# Patient Record
Sex: FEMALE | Race: BLACK OR AFRICAN AMERICAN | NOT HISPANIC OR LATINO
[De-identification: names, ages, dates, MRNs, and addresses within clinical notes are randomized per-mention and may not be internally consistent; named-entity substitution may affect disease eponyms.]

---
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
## 2023-03-06 ENCOUNTER — EMERGENCY (EMERGENCY)
Facility: HOSPITAL | Age: 18
LOS: 1 days | Discharge: HOME | End: 2023-03-06
Attending: EMERGENCY MEDICINE | Admitting: EMERGENCY MEDICINE
Payer: MEDICAID

## 2023-03-06 ENCOUNTER — EMERGENCY (EMERGENCY)
Facility: HOSPITAL | Age: 18
LOS: 1 days | Discharge: ANOTHER HOSPITAL | End: 2023-03-06
Attending: EMERGENCY MEDICINE | Admitting: EMERGENCY MEDICINE
Payer: MEDICAID

## 2023-03-06 VITALS
OXYGEN SATURATION: 98 % | TEMPERATURE: 100 F | DIASTOLIC BLOOD PRESSURE: 82 MMHG | SYSTOLIC BLOOD PRESSURE: 125 MMHG | HEIGHT: 71 IN | RESPIRATION RATE: 18 BRPM | HEART RATE: 96 BPM

## 2023-03-06 VITALS
SYSTOLIC BLOOD PRESSURE: 116 MMHG | RESPIRATION RATE: 18 BRPM | TEMPERATURE: 101 F | DIASTOLIC BLOOD PRESSURE: 82 MMHG | OXYGEN SATURATION: 99 % | HEART RATE: 108 BPM

## 2023-03-06 VITALS
OXYGEN SATURATION: 100 % | WEIGHT: 225.09 LBS | RESPIRATION RATE: 16 BRPM | HEART RATE: 98 BPM | SYSTOLIC BLOOD PRESSURE: 136 MMHG | TEMPERATURE: 99 F | DIASTOLIC BLOOD PRESSURE: 94 MMHG | HEIGHT: 71 IN

## 2023-03-06 LAB
ALBUMIN SERPL ELPH-MCNC: 3.3 G/DL — SIGNIFICANT CHANGE UP (ref 3.3–5)
ALP SERPL-CCNC: 84 U/L — SIGNIFICANT CHANGE UP (ref 30–120)
ALT FLD-CCNC: 54 U/L DA — SIGNIFICANT CHANGE UP (ref 10–60)
ANION GAP SERPL CALC-SCNC: 9 MMOL/L — SIGNIFICANT CHANGE UP (ref 5–17)
AST SERPL-CCNC: 75 U/L — HIGH (ref 10–40)
BASOPHILS # BLD AUTO: 0.03 K/UL — SIGNIFICANT CHANGE UP (ref 0–0.2)
BASOPHILS NFR BLD AUTO: 0.3 % — SIGNIFICANT CHANGE UP (ref 0–2)
BILIRUB SERPL-MCNC: 0.3 MG/DL — SIGNIFICANT CHANGE UP (ref 0.2–1.2)
BUN SERPL-MCNC: 11 MG/DL — SIGNIFICANT CHANGE UP (ref 7–23)
CALCIUM SERPL-MCNC: 8.7 MG/DL — SIGNIFICANT CHANGE UP (ref 8.4–10.5)
CHLORIDE SERPL-SCNC: 105 MMOL/L — SIGNIFICANT CHANGE UP (ref 96–108)
CO2 SERPL-SCNC: 26 MMOL/L — SIGNIFICANT CHANGE UP (ref 22–31)
CREAT SERPL-MCNC: 1.01 MG/DL — SIGNIFICANT CHANGE UP (ref 0.5–1.3)
EGFR: 82 ML/MIN/1.73M2 — SIGNIFICANT CHANGE UP
EOSINOPHIL # BLD AUTO: 0.07 K/UL — SIGNIFICANT CHANGE UP (ref 0–0.5)
EOSINOPHIL NFR BLD AUTO: 0.7 % — SIGNIFICANT CHANGE UP (ref 0–6)
FLUAV AG NPH QL: SIGNIFICANT CHANGE UP
FLUBV AG NPH QL: SIGNIFICANT CHANGE UP
GLUCOSE SERPL-MCNC: 99 MG/DL — SIGNIFICANT CHANGE UP (ref 70–99)
HCG UR QL: NEGATIVE — SIGNIFICANT CHANGE UP
HCT VFR BLD CALC: 34.3 % — LOW (ref 34.5–45)
HGB BLD-MCNC: 11 G/DL — LOW (ref 11.5–15.5)
IMM GRANULOCYTES NFR BLD AUTO: 0.2 % — SIGNIFICANT CHANGE UP (ref 0–0.9)
LYMPHOCYTES # BLD AUTO: 2.3 K/UL — SIGNIFICANT CHANGE UP (ref 1–3.3)
LYMPHOCYTES # BLD AUTO: 21.9 % — SIGNIFICANT CHANGE UP (ref 13–44)
MCHC RBC-ENTMCNC: 27.4 PG — SIGNIFICANT CHANGE UP (ref 27–34)
MCHC RBC-ENTMCNC: 32.1 GM/DL — SIGNIFICANT CHANGE UP (ref 32–36)
MCV RBC AUTO: 85.5 FL — SIGNIFICANT CHANGE UP (ref 80–100)
MONOCYTES # BLD AUTO: 0.79 K/UL — SIGNIFICANT CHANGE UP (ref 0–0.9)
MONOCYTES NFR BLD AUTO: 7.5 % — SIGNIFICANT CHANGE UP (ref 2–14)
NEUTROPHILS # BLD AUTO: 7.29 K/UL — SIGNIFICANT CHANGE UP (ref 1.8–7.4)
NEUTROPHILS NFR BLD AUTO: 69.4 % — SIGNIFICANT CHANGE UP (ref 43–77)
NRBC # BLD: 0 /100 WBCS — SIGNIFICANT CHANGE UP (ref 0–0)
PLATELET # BLD AUTO: 289 K/UL — SIGNIFICANT CHANGE UP (ref 150–400)
POTASSIUM SERPL-MCNC: 3.8 MMOL/L — SIGNIFICANT CHANGE UP (ref 3.5–5.3)
POTASSIUM SERPL-SCNC: 3.8 MMOL/L — SIGNIFICANT CHANGE UP (ref 3.5–5.3)
PROT SERPL-MCNC: 7.3 G/DL — SIGNIFICANT CHANGE UP (ref 6–8.3)
RBC # BLD: 4.01 M/UL — SIGNIFICANT CHANGE UP (ref 3.8–5.2)
RBC # FLD: 13.1 % — SIGNIFICANT CHANGE UP (ref 10.3–14.5)
RSV RNA NPH QL NAA+NON-PROBE: SIGNIFICANT CHANGE UP
S PYO DNA THROAT QL NAA+PROBE: SIGNIFICANT CHANGE UP
SARS-COV-2 RNA SPEC QL NAA+PROBE: SIGNIFICANT CHANGE UP
SODIUM SERPL-SCNC: 140 MMOL/L — SIGNIFICANT CHANGE UP (ref 135–145)
WBC # BLD: 10.5 K/UL — SIGNIFICANT CHANGE UP (ref 3.8–10.5)
WBC # FLD AUTO: 10.5 K/UL — SIGNIFICANT CHANGE UP (ref 3.8–10.5)

## 2023-03-06 PROCEDURE — 80053 COMPREHEN METABOLIC PANEL: CPT

## 2023-03-06 PROCEDURE — L9997: CPT

## 2023-03-06 PROCEDURE — 36415 COLL VENOUS BLD VENIPUNCTURE: CPT

## 2023-03-06 PROCEDURE — 96375 TX/PRO/DX INJ NEW DRUG ADDON: CPT | Mod: XU

## 2023-03-06 PROCEDURE — 96366 THER/PROPH/DIAG IV INF ADDON: CPT

## 2023-03-06 PROCEDURE — 87798 DETECT AGENT NOS DNA AMP: CPT

## 2023-03-06 PROCEDURE — 70491 CT SOFT TISSUE NECK W/DYE: CPT | Mod: MA

## 2023-03-06 PROCEDURE — 96365 THER/PROPH/DIAG IV INF INIT: CPT | Mod: XU

## 2023-03-06 PROCEDURE — 99285 EMERGENCY DEPT VISIT HI MDM: CPT

## 2023-03-06 PROCEDURE — 99285 EMERGENCY DEPT VISIT HI MDM: CPT | Mod: 25

## 2023-03-06 PROCEDURE — 96361 HYDRATE IV INFUSION ADD-ON: CPT

## 2023-03-06 PROCEDURE — 85025 COMPLETE CBC W/AUTO DIFF WBC: CPT

## 2023-03-06 PROCEDURE — 87651 STREP A DNA AMP PROBE: CPT

## 2023-03-06 PROCEDURE — 87637 SARSCOV2&INF A&B&RSV AMP PRB: CPT

## 2023-03-06 PROCEDURE — 81025 URINE PREGNANCY TEST: CPT

## 2023-03-06 PROCEDURE — 70491 CT SOFT TISSUE NECK W/DYE: CPT | Mod: 26,MA

## 2023-03-06 RX ORDER — KETOROLAC TROMETHAMINE 30 MG/ML
15 SYRINGE (ML) INJECTION ONCE
Refills: 0 | Status: DISCONTINUED | OUTPATIENT
Start: 2023-03-06 | End: 2023-03-06

## 2023-03-06 RX ORDER — ACETAMINOPHEN 500 MG
1000 TABLET ORAL ONCE
Refills: 0 | Status: COMPLETED | OUTPATIENT
Start: 2023-03-06 | End: 2023-03-06

## 2023-03-06 RX ORDER — SODIUM CHLORIDE 9 MG/ML
1000 INJECTION INTRAMUSCULAR; INTRAVENOUS; SUBCUTANEOUS ONCE
Refills: 0 | Status: COMPLETED | OUTPATIENT
Start: 2023-03-06 | End: 2023-03-06

## 2023-03-06 RX ORDER — AMPICILLIN SODIUM AND SULBACTAM SODIUM 250; 125 MG/ML; MG/ML
3 INJECTION, POWDER, FOR SUSPENSION INTRAMUSCULAR; INTRAVENOUS EVERY 6 HOURS
Refills: 0 | Status: DISCONTINUED | OUTPATIENT
Start: 2023-03-06 | End: 2023-03-10

## 2023-03-06 RX ORDER — IBUPROFEN 200 MG
600 TABLET ORAL ONCE
Refills: 0 | Status: COMPLETED | OUTPATIENT
Start: 2023-03-06 | End: 2023-03-06

## 2023-03-06 RX ORDER — DEXAMETHASONE 0.5 MG/5ML
8 ELIXIR ORAL ONCE
Refills: 0 | Status: COMPLETED | OUTPATIENT
Start: 2023-03-06 | End: 2023-03-06

## 2023-03-06 RX ORDER — SODIUM CHLORIDE 9 MG/ML
1000 INJECTION, SOLUTION INTRAVENOUS ONCE
Refills: 0 | Status: COMPLETED | OUTPATIENT
Start: 2023-03-06 | End: 2023-03-06

## 2023-03-06 RX ADMIN — Medication 101.6 MILLIGRAM(S): at 17:34

## 2023-03-06 RX ADMIN — Medication 400 MILLIGRAM(S): at 19:33

## 2023-03-06 RX ADMIN — Medication 8 MILLIGRAM(S): at 19:00

## 2023-03-06 RX ADMIN — SODIUM CHLORIDE 1000 MILLILITER(S): 9 INJECTION INTRAMUSCULAR; INTRAVENOUS; SUBCUTANEOUS at 15:15

## 2023-03-06 RX ADMIN — Medication 600 MILLIGRAM(S): at 22:00

## 2023-03-06 RX ADMIN — AMPICILLIN SODIUM AND SULBACTAM SODIUM 200 GRAM(S): 250; 125 INJECTION, POWDER, FOR SUSPENSION INTRAMUSCULAR; INTRAVENOUS at 17:34

## 2023-03-06 RX ADMIN — AMPICILLIN SODIUM AND SULBACTAM SODIUM 3 GRAM(S): 250; 125 INJECTION, POWDER, FOR SUSPENSION INTRAMUSCULAR; INTRAVENOUS at 19:00

## 2023-03-06 RX ADMIN — SODIUM CHLORIDE 1000 MILLILITER(S): 9 INJECTION, SOLUTION INTRAVENOUS at 22:00

## 2023-03-06 NOTE — ED PROVIDER NOTE - OBJECTIVE STATEMENT
Patient complaining of sore throat for 1 week worsening.  Patient relates she had seen the doctor at her school 3 days ago had a negative strep test, was given amoxicillin anyway which she has been taking without improvement.  Patient relates pain is also that she is unable to tolerate any p.o. intake.  Patient reports occasional nonproductive cough.  No fevers chills headache chest pain short of breath abdominal pain nausea vomiting  Pediatrician sawyer

## 2023-03-06 NOTE — ED PROVIDER NOTE - DIFFERENTIAL DIAGNOSIS
Differential includes but not limited to strep flu COVID RSV electrolyte abnormality Ambrocio's angina retropharyngeal abscess epiglottitis Differential Diagnosis

## 2023-03-06 NOTE — ED ADULT TRIAGE NOTE - CHIEF COMPLAINT QUOTE
transfer from [place] for ent    pt is transfer from [place] for ent consult. difficult to speak but no diff breathing.  no drooling, stridor.  denies pmhx

## 2023-03-06 NOTE — ED ADULT NURSE REASSESSMENT NOTE - NS ED NURSE REASSESS COMMENT FT1
Pt received at 1900. Pt AAOx4, speaking in full sentences with no difficulty, no drooling noted, respirations even and unlabored, lungs CTA BL. Pt reporting mild difficulty breathing through mouth, spo2 99% on RA. Pt placed on cont spo2 monitoring. Denies CP, N/V, abdominal pain, dizziness/lightheadedness. Pt febrile and ED MD aware, pt to get IV tylenol as ordered. Pt aware of awaiting transfer to [facility] at this time. Bed locked in lowest position with appropriate side rails raised. Pt given call bell and explained call bell system. Pt has no other questions or concerns at this time.
Pt for transfer to [facility] - as arranged by Dr Metz for ENT consultation . Report given to Nurse Gale of [facility] ER

## 2023-03-06 NOTE — ED PROVIDER NOTE - PHYSICAL EXAMINATION
VS:  unremarkable except LG temp    GEN - NAD;   malaise;   A+O x3   HEAD - NC/AT     ENT - PEERL, EOMI, mucous membranes    moist , no discharge    Voice normal, airway patent, managing secretions, no trismus.  Able to drink water.  Tonsils swollen but not kissing.  Mild neck ttp and no deformity, no floor of mouth swelling.  ROM of neck is good.    NECK: Neck supple, non-tender without lymphadenopathy, no masses, no JVD  PULM - CTA b/l,  symmetric breath sounds  COR -  normal heart sounds    ABD - , ND, NT, soft,  BACK - no CVA tenderness, nontender spine     EXTREMS - no edema, no deformity, warm and well perfused    SKIN - no rash    or bruising      NEUROLOGIC - alert, face symmetric, speech fluent, sensation nl, motor no focal deficit.

## 2023-03-06 NOTE — ED PROVIDER NOTE - CONSTITUTIONAL, MLM
normal... Well appearing, awake, alert, oriented to person, place, time/situation and in no apparent distress. no drooling

## 2023-03-06 NOTE — ED ADULT NURSE NOTE - OBJECTIVE STATEMENT
Pt came in for sore throat Pt came in for sore throat and difficulty swallowing. Pt reported have sore throat for 5 day now. Pt went to see her school clinic and prescribed with oral Amoxicillin . Pt also stated that her throat swab was negative for step. Pt came in for worsening throat pain and redness . No exudate noted. Pt denies any vomiting, nausea, abdominal pain , diarrhea , fever or chills.

## 2023-03-06 NOTE — ED PROVIDER NOTE - CLINICAL SUMMARY MEDICAL DECISION MAKING FREE TEXT BOX
17F p/w xferred for ENT eval.  Pt reports has been having sore throat x 1 week, went to [facility] due to worsening pain and difficulty swallowing, unable to swallow food, difficulty swallowing liquids.  At [facility] she got unasyn and decadron, ofirmev and fluids, CT was done showing tonsillitis.  Since then she's been feeling better.  D/w ENT Dr Navarrete - the reason they transferred pt was because they did not have anyone to intubate her if she needed it given her airway was 6-7mm.  There was no abscess on CT there was only tonsillitis.  No indication for ENT intervention per me and per ENT.  No indication for intubation and no concern for airway compromise.  Able to drink water and manage pills.  No fever.  Able to manage her secretions, no trismus, no drooling.  PMHX denies  PSHX denies.  No meds.  No T NKDA.  Pt able to drink water.  Rx ibuprofen, fluids, reass.  Will rx augmentin x 10 days, steroids per ENT reccs, d/c home f/u ENT as outpt.

## 2023-03-06 NOTE — ED ADULT TRIAGE NOTE - NS ED TRIAGE AVPU SCALE
Your Child's Health  Girls over 12      Aury Jon  June 11, 2021    There were no vitals taken for this visit.  Weight:       Adolescence begins about age 10 in girls and ends somewhere in the early twenties.  During this time many changes occur.    BODY CHANGES:  There is no other time of life, except in the first year, when your body grows so rapidly.  This is called a \"growth spurt\" and usually precedes and accompanies changes in your body shape as well as changes in your sexual organs.  Bones and muscles grow rapidly, fat is added and distributed in an adult manner, and the result is a rapid increase in height and weight.  It may even take a while for your coordination and skills to catch up with your rapidly changing body.  Your hands and feet are the first to grow, followed by lengthening of your arms and legs.  Finally, your spine grows, and your rib cage and your pelvis widen.    Various hormones secreted by the endocrine glands lead to development.  The first major change is breast development.  The second is usually pubic hair (hair between the legs), followed by body odor, hair in the armpits, and finally menstruation (periodic bleeding or periods).  In some families, the hair and odor precede the development of the breast tissue.  The whole process takes about two years, although in some cases it may take as long as four or five years.    SEXUAL DEVELOPMENT AND MENSTRUATION:  Breast development may normally begin at any time after the age of eight.  One side may begin to grow several months before the other, and unevenness is common, especially in the first year.  About two-thirds of adult women are somewhat unequal from one side to the other; small differences are insignificant, but large differences may cause considerable worry later.  Bloody or milky discharge from the breast is never normal.  Please let us know if this occurs.    Girls whose breasts begin to develop early will  usually grow early, as compared to their friends. Girls who begin to develop later than average (14 years and up) will continue to grow after their friends have stopped.    The second sign of external development is growth of pubic hair.  The third is growth of underarm hair, and the last is menstruation.  Menstruation occurs about two years after breast development starts.  It may begin earlier or, in rare cases, as much as five years later.  Let us know if you have monthly lower abdominal discomfort or pain without any blood.  Many girls will have a clear mucous vaginal discharge prior to their periods.  This is normal, as long as it doesn't itch, burn, or have a strong odor.    Often, menstrual periods occur irregularly at first.  With time, they usually become more regular.  \"Cramps\" can occur in the lower abdomen or in the back.  They are usually mild, and sometimes increase in severity with age. Cramps are the result of an excess of the muscle contraction chemical in the uterus (womb).  They are not a sign of weakness or low pain tolerance.  Acetaminophen or ibuprofen are often helpful.  The newer ibuprofen medicines help 85 percent of girls.  Sometimes a prescribed medication may be needed if these over-the-counter medications are not adequate.     Some women experience Premenstrual Syndrome (PMS), which is characterized by bloating, swollen or tender breasts, headaches, depression, or irritability.  These symptoms occur prior to and at the beginning of a menstrual period.  This is unusual in teens and should be discussed with your doctor if it occurs.    Tampons are generally safe, comfortable, and easy to use, but adolescents who use tampons should be aware of the small risk of Toxic Shock Syndrome.  This results from a bacterial infection that may occur in tampon users who leave tampons in for more than four hours.  Symptoms include high fever, headache, vomiting, diarrhea, rash, and shock.  Should these  symptoms occur while you are using a tampon, you should remove it and notify your doctor immediately.  If you would like to use tampons but feel uncomfortable asking your mother how they are properly used, a female healthcare worker can demonstrate this for you.    There is a lot of normal variability in period timing.  However, you should make an appointment if the time between the start of one period and the start of the next is less than three weeks, or if the time between periods is more than 3 months.  Other signs that an exam may be needed are very short periods of two days or less, uncontrollable cramps, or any risk of pregnancy or venereal disease.    Once you start having periods, it is physically possible to get pregnant and have a baby.  Becoming pregnant is not a decision to be taken lightly.  There is no sure way to avoid pregnancy or venereal disease except to avoid sexual contact.  If 100 girls take the best known birth control and follow the directions perfectly, one will get pregnant each year.     Girls get venereal diseases from boys more easily than boys get them from girls, so girls have to be more careful.  No matter what anybody says, AIDS is fatal and a cure in the near future is not likely.  Please don't take chances.    SKIN PROBLEMS:  Acne affects 90 percent of teens.  It often starts earlier in girls than in boys, and it is generally at its worst around 15-16 years of age.  There are a lot of exceptions.  Call us if the over-the-counter medications are not helping.  Remember that washing too frequently, scrubbing, or drying vigorously may convert blackheads into white or red pimples.  Benzoyl peroxide is the most effective ingredient of those available over the counter.    Most of us are born without brown spots on our skin (also called moles or wens).  By the time we are 20 years old, the average person has about 40 such spots.  They are normal as long as they remain brown, have a sharp  edge, and remain smaller than the diameter of a pencil eraser.  If you have moles with any other characteristics, please show them to your doctor.  We would especially like to see moles that have been present since birth.  Please also let us know if there is skin cancer in your family.  Skin cancer almost never occurs under ten years of age, but there are a few cases in the teens.  The most dangerous skin cancers occur in people who have had sunburn, especially repeatedly, which is why we recommend sunscreens and protective clothing.     DIET:  It is important to maintain a balanced diet.  Calcium, most readily available in dairy products, is particularly important for strong bones.  If you cannot drink 3 glasses of milk a day without stomach aches, please tell us.  Limit your intake of fatty snacks (chips, fries, etc.).  Avoid eating in front of the TV or while preoccupied with music or videos.  You often eat more than you really want when distracted.  It is always important to eat as wide a variety of fruits and vegetables as you can.  If you drink less than 32 oz of milk per day, you should take a multivitamin with 600 International Units vitamin D.    Adolescent girls also need iron in their diets.  Menstruation depletes your iron supply.  Iron, found in red meats and dark green vegetables, is necessary as part of your daily diet to prevent anemia.    SAFETY:  Car accidents and gunshot wounds are major killers in your age range.  Your reflexes are quick, but seatbelts are still necessary when you ride in a car.  \"Friends\" who drive recklessly, drink and drive, use drugs, or play with guns or knives are being unreasonable and dangerous.  No one wants to be called a \"chicken\", but standing up to being called a chicken in front of others is braver and safer.    Backpacks may cause neck strain or weakening of the arms if they exceed 15 percent  of your weight or are carried for long periods over just one  shoulder.    SUBSTANCE ABUSE:  [place] unfortunately leads the nation in drinking and binge drinking.  Remember that every beer is 120-150 calories.  If you want information about alcohol, tobacco, or drugs for yourself or a friend, please ask us.  We will be glad to talk with you or mail information to you.  It is very hard to stop smoking once you start.  Please don't start.    RESPECT AND DATING:  When boys and girls get together, both the girls and the boys are trying to make a good impression.  Some people can make a good impression just by being themselves, while others may try a little too hard to please.  When you begin dating, you will probably want to date with a group of friends or date where an adult is within calling distance.  You are entitled to choose whom you would like to date and where.  At your age, most boys are becoming physically stronger.  Some have come to believe from stories they have heard that girls like to be treated roughly.  NO ONE HAS THE RIGHT TO HURT YOU.    Watch how your date treats his friends and family.  If he is mean to everyone else, be careful.  Despite what you see on TV or hear from your friends, most girls do not spend a night alone with a boy until after high school.  You are a good person who should have the confidence to expect friends (male or female) to treat you with respect.  Please tell your parents or tell us if someone has hurt you.    MEDICATIONS:  Once you are 12 or older, you can generally take adult doses of over-the-counter medications. However, you should not take extra-strength doses of acetaminophen (for example, 500 mg Tylenol tablets) unless you weigh more than 140 pounds.    MEDICATION FOR FEVER OR PAIN:   Acetaminophen liquid (e.g., Tylenol or Tempra) may be given every four hours as needed for pain or fever.  Acetaminophen liquid is less concentrated than the infant dropper bottle type.  Be sure to check which product CONCENTRATION you are  using.      CHILDREN’S Tylenol/Acetaminophen  (160 MG/5 mL)    Child’s Weight:  Dose:  36 - 47 pounds:    240 mg (7.5 mL (1 1/2 Teaspoons))  48 - 59 pounds:    320 mg (10.0 mL (2 Teaspoons))  60 - 71 pounds:    400 mg (12.5 mL (2 1/2 Teaspoons))  72 - 95 pounds:    480 mg (15.0 mL (3 Teaspoons))  Greater than 96 pounds:   640 mg (20.0 mL (4 Teaspoons))    CHILDREN’S Tylenol/Acetaminophen MELTAWAYS ( 80 MG tablets)    Child’s Weight:  Dose:  36 - 47 pounds:    240 mg (3 meltaway tablets)  48 - 59 pounds:    320 mg (4 meltaway tablets)  60 - 71 pounds:    400 mg (5 meltaway tablets)  72 - 95 pounds:    480 mg (6 meltaway tablets)  Greater than 96 pounds:   640 mg (8 meltaway tablets)    David () Tylenol/Acetaminophen MELTAWAYS (160 MG tablets)    Child’s Weight:  Dose:  36 - 47 pounds:    240 mg (1 1/2 meltaway tablets)  48 - 59 pounds:    320 mg (2 meltaway tablets)  60 - 71 pounds:    400 mg (2 1/2 meltaway tablets)  72 - 95 pounds:    480 mg (3 meltaway tablets)  Greater than 96 pounds:   640 mg (4 meltaway tablets)    CHILDREN'S Ibuprofen (e.g., Advil or Motrin) may be given every six hours as needed for pain or fever.    48 - 59 pounds:                       200 mg (2 Teaspoons)  60 - 71 pounds:                       250 mg (2 1/2 Teaspoons)  72 - 95 pounds:                       300 mg (3 Teaspoons)    NEXT VISIT: 1 year      Thank you for entrusting your care to [facility].   Alert-The patient is alert, awake and responds to voice. The patient is oriented to time, place, and person. The triage nurse is able to obtain subjective information.

## 2023-03-06 NOTE — ED PROVIDER NOTE - NSFOLLOWUPINSTRUCTIONS_ED_ALL_ED_FT
FOLLOW UP WITH ENT WITHIN 1 WEEK  BRING THE COPIES OF YOUR RESULTS WITH YOU (PROVIDED)  CAN TAKE TYLENOL 650MG ORALLY EVERY 6 HOURS FOR PAIN OR FEVER.  IBUPROFEN 400MG ORALLY EVERY 6 HOURS FOR PAIN OR FEVER.    CAN TAKE TYLENOL AND IBUPROFEN AT THE SAME TIME  RETURN TO ED FOR NEW OR WORSENING SYMPTOMS.    AUGMENTIN 875/125 MG ORALLY TWICE DAILY FOR 10 DAYS  PREDNISONE 40MG ORALLY DAILY FOR 4 MORE DAYS START TOMORROW.

## 2023-03-06 NOTE — ED PROVIDER NOTE - PROGRESS NOTE DETAILS
d/w waldo ([facility]) requests xfer er->er to [facility] d/w dacia ([facility]) will accept xfer er->er

## 2023-03-06 NOTE — ED PROVIDER NOTE - OBJECTIVE STATEMENT
17F p/w xferred for ENT eval.  Pt reports has been having sore throat x 1 week, went to [facility] due to worsening pain and difficulty swallowing, unable to swallow food, difficulty swallowing liquids.  At [facility] she got unasyn and decadron, ofirmev and fluids, CT was done showing tonsillitis.  Since then she's been feeling better.  D/w ENT Dr Navarrete - the reason they transferred pt was because they did not have anyone to intubate her if she needed it given her airway was 6-7mm.  There was no abscess on CT there was only tonsillitis.  No indication for ENT intervention per me and per .  No fever.  Able to manage her secretions, no trismus, no drooling.  PMHX denies  PSHX denies.  No meds.  No T NKDA.  Pt able to drink water.  Rx ibuprofen, fluids, reass.  Will rx augmentin x 10 days, steroids per ENT reccs, d/c home f/u ENT as outpt.    VS:  unremarkable except LG temp    GEN - NAD;   malaise;   A+O x3   HEAD - NC/AT     ENT - PEERL, EOMI, mucous membranes    moist , no discharge    Voice normal, airway patent, managing secretions, no trismus.  Able to drink water.  Tonsils swollen but not kissing.  Mild neck ttp and no deformity, no floor of mouth swelling.  ROM of neck is good.    NECK: Neck supple, non-tender without lymphadenopathy, no masses, no JVD  PULM - CTA b/l,  symmetric breath sounds  COR -  normal heart sounds    ABD - , ND, NT, soft,  BACK - no CVA tenderness, nontender spine     EXTREMS - no edema, no deformity, warm and well perfused    SKIN - no rash    or bruising      NEUROLOGIC - alert, face symmetric, speech fluent, sensation nl, motor no focal deficit. 17F p/w xferred for ENT eval.  Pt reports has been having sore throat x 1 week, went to [facility] due to worsening pain and difficulty swallowing, unable to swallow food, difficulty swallowing liquids.  At [facility] she got unasyn and decadron, ofirmev and fluids, CT was done showing tonsillitis.  Since then she's been feeling better.  D/w ENT Dr Navarrete - the reason they transferred pt was because they did not have anyone to intubate her if she needed it given her airway was 6-7mm.  There was no abscess on CT there was only tonsillitis.  No indication for ENT intervention per me and per ENT.  No indication for intubation and no concern for airway compromise.  Able to drink water and manage pills.  No fever.  Able to manage her secretions, no trismus, no drooling.  PMHX denies  PSHX denies.  No meds.  No T NKDA.  Pt able to drink water.  Rx ibuprofen, fluids, reass.  Will rx augmentin x 10 days, steroids per ENT reccs, d/c home f/u ENT as outpt.    VS:  unremarkable except LG temp    GEN - NAD;   malaise;   A+O x3   HEAD - NC/AT     ENT - PEERL, EOMI, mucous membranes    moist , no discharge    Voice normal, airway patent, managing secretions, no trismus.  Able to drink water.  Tonsils swollen but not kissing.  Mild neck ttp and no deformity, no floor of mouth swelling.  ROM of neck is good.    NECK: Neck supple, non-tender without lymphadenopathy, no masses, no JVD  PULM - CTA b/l,  symmetric breath sounds  COR -  normal heart sounds    ABD - , ND, NT, soft,  BACK - no CVA tenderness, nontender spine     EXTREMS - no edema, no deformity, warm and well perfused    SKIN - no rash    or bruising      NEUROLOGIC - alert, face symmetric, speech fluent, sensation nl, motor no focal deficit.

## 2023-04-19 NOTE — ED ADULT NURSE NOTE - CHIEF COMPLAINT QUOTE
alert and awake/follows commands sore throat treated with amoxicillin fr 4 days  states cant swallow

## 2023-10-02 NOTE — ED ADULT TRIAGE NOTE - WEIGHT IN LBS
"[place]" Orville was seen and treated in our emergency department on 10/2/2023.  He may return to school on 10/03/2023.      If you have any questions or concerns, please don't hesitate to call.      Wayne Womack MD" [phone]

## 2024-03-20 ENCOUNTER — EMERGENCY (EMERGENCY)
Facility: HOSPITAL | Age: 19
LOS: 1 days | Discharge: HOME | End: 2024-03-20
Attending: EMERGENCY MEDICINE | Admitting: EMERGENCY MEDICINE
Payer: MEDICAID

## 2024-03-20 VITALS
HEART RATE: 79 BPM | SYSTOLIC BLOOD PRESSURE: 109 MMHG | DIASTOLIC BLOOD PRESSURE: 72 MMHG | RESPIRATION RATE: 18 BRPM | TEMPERATURE: 99 F | WEIGHT: 220.46 LBS | OXYGEN SATURATION: 100 % | HEIGHT: 70 IN

## 2024-03-20 LAB — HCG UR QL: NEGATIVE — SIGNIFICANT CHANGE UP

## 2024-03-20 PROCEDURE — 99285 EMERGENCY DEPT VISIT HI MDM: CPT | Mod: 25

## 2024-03-20 PROCEDURE — 93010 ELECTROCARDIOGRAM REPORT: CPT

## 2024-03-20 RX ORDER — AMOXICILLIN 250 MG/5ML
0 SUSPENSION, RECONSTITUTED, ORAL (ML) ORAL
Qty: 0 | Refills: 0 | DISCHARGE

## 2024-03-20 NOTE — ED PROVIDER NOTE - OBJECTIVE STATEMENT
18y F BIBEMS for syncopal episode - pt states for the past week her sleep pattern has been off, staying up late at night, taking naps during the day, states got up this morning, had some chipotle, took a nap from 11 to 5, got up, ate cereal, relaxed, this evening went to take hot shower, during shower started to feel like breathing was off, lightheaded, ears underwater, turned hot water down, symptoms continued, got out of the shower, sat in a stall, called her mom, appears to have had +LOC, no fall/trauma, woke and her mom was screaming through the phone, she was able to get up, felt tingling/cramping left hand, walked back to her room, felt tingling/cramping right leg, called 911, right now still feels a little lightheaded
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
## 2024-03-20 NOTE — ED ADULT NURSE NOTE - OBJECTIVE STATEMENT
pt to ED; BIBA; reports she "passed out". states she last ate about 12 hours ago and her "sleep schedule is off". c/o dizziness and nausea at this time. denies head injury

## 2024-03-20 NOTE — ED PROVIDER NOTE - PATIENT PORTAL LINK FT
You can access the FollowMyHealth Patient Portal offered by [facility] by registering at the following website: [email or web address]. By joining [employer]’s FollowMyHealth portal, you will also be able to view your health information using other applications (apps) compatible with our system.

## 2024-03-20 NOTE — ED PROVIDER NOTE - CLINICAL SUMMARY MEDICAL DECISION MAKING FREE TEXT BOX
syncopal episode, pt reports not sleeping/eating well recently, will check ekg, monitor tele, check basic and cardiac labs, r/o PE with dimer, check cxr

## 2024-03-20 NOTE — ED ADULT NURSE NOTE - NSFALLHARMRISKINTERV_ED_ALL_ED
Assistance OOB with selected safe patient handling equipment if applicable/Assistance with ambulation/Communicate risk of Fall with Harm to all staff, patient, and family/Encourage patient to sit up slowly, dangle for a short time, stand at bedside before walking/Monitor gait and stability/Orthostatic vital signs/Provide visual cue: red socks, yellow wristband, yellow gown, etc/Reinforce activity limits and safety measures with patient and family/Bed in lowest position, wheels locked, appropriate side rails in place/Call bell, personal items and telephone in reach/Instruct patient to call for assistance before getting out of bed/chair/stretcher/Non-slip footwear applied when patient is off stretcher/[place] to call system/Physically safe environment - no spills, clutter or unnecessary equipment/Purposeful Proactive Rounding/Room/bathroom lighting operational, light cord in reach

## 2024-03-21 VITALS
SYSTOLIC BLOOD PRESSURE: 101 MMHG | TEMPERATURE: 98 F | OXYGEN SATURATION: 100 % | HEART RATE: 64 BPM | RESPIRATION RATE: 17 BRPM | DIASTOLIC BLOOD PRESSURE: 68 MMHG

## 2024-03-21 LAB
ALBUMIN SERPL ELPH-MCNC: 3.6 G/DL — SIGNIFICANT CHANGE UP (ref 3.3–5)
ALP SERPL-CCNC: 74 U/L — SIGNIFICANT CHANGE UP (ref 30–120)
ALT FLD-CCNC: 19 U/L — SIGNIFICANT CHANGE UP (ref 10–60)
ANION GAP SERPL CALC-SCNC: 8 MMOL/L — SIGNIFICANT CHANGE UP (ref 5–17)
AST SERPL-CCNC: 15 U/L — SIGNIFICANT CHANGE UP (ref 10–40)
BASOPHILS # BLD AUTO: 0.06 K/UL — SIGNIFICANT CHANGE UP (ref 0–0.2)
BASOPHILS NFR BLD AUTO: 0.6 % — SIGNIFICANT CHANGE UP (ref 0–2)
BILIRUB SERPL-MCNC: 0.3 MG/DL — SIGNIFICANT CHANGE UP (ref 0.2–1.2)
BUN SERPL-MCNC: 8 MG/DL — SIGNIFICANT CHANGE UP (ref 7–23)
CALCIUM SERPL-MCNC: 9.5 MG/DL — SIGNIFICANT CHANGE UP (ref 8.4–10.5)
CHLORIDE SERPL-SCNC: 104 MMOL/L — SIGNIFICANT CHANGE UP (ref 96–108)
CO2 SERPL-SCNC: 28 MMOL/L — SIGNIFICANT CHANGE UP (ref 22–31)
CREAT SERPL-MCNC: 1.15 MG/DL — SIGNIFICANT CHANGE UP (ref 0.5–1.3)
D DIMER BLD IA.RAPID-MCNC: <150 NG/ML DDU — SIGNIFICANT CHANGE UP
EGFR: 70 ML/MIN/1.73M2 — SIGNIFICANT CHANGE UP
EOSINOPHIL # BLD AUTO: 0.04 K/UL — SIGNIFICANT CHANGE UP (ref 0–0.5)
EOSINOPHIL NFR BLD AUTO: 0.4 % — SIGNIFICANT CHANGE UP (ref 0–6)
GLUCOSE SERPL-MCNC: 96 MG/DL — SIGNIFICANT CHANGE UP (ref 70–99)
HCT VFR BLD CALC: 38.6 % — SIGNIFICANT CHANGE UP (ref 34.5–45)
HGB BLD-MCNC: 12.5 G/DL — SIGNIFICANT CHANGE UP (ref 11.5–15.5)
IMM GRANULOCYTES NFR BLD AUTO: 0.3 % — SIGNIFICANT CHANGE UP (ref 0–0.9)
LYMPHOCYTES # BLD AUTO: 2.43 K/UL — SIGNIFICANT CHANGE UP (ref 1–3.3)
LYMPHOCYTES # BLD AUTO: 23.3 % — SIGNIFICANT CHANGE UP (ref 13–44)
MAGNESIUM SERPL-MCNC: 1.8 MG/DL — SIGNIFICANT CHANGE UP (ref 1.6–2.6)
MCHC RBC-ENTMCNC: 27.2 PG — SIGNIFICANT CHANGE UP (ref 27–34)
MCHC RBC-ENTMCNC: 32.4 GM/DL — SIGNIFICANT CHANGE UP (ref 32–36)
MCV RBC AUTO: 83.9 FL — SIGNIFICANT CHANGE UP (ref 80–100)
MONOCYTES # BLD AUTO: 0.49 K/UL — SIGNIFICANT CHANGE UP (ref 0–0.9)
MONOCYTES NFR BLD AUTO: 4.7 % — SIGNIFICANT CHANGE UP (ref 2–14)
NEUTROPHILS # BLD AUTO: 7.37 K/UL — SIGNIFICANT CHANGE UP (ref 1.8–7.4)
NEUTROPHILS NFR BLD AUTO: 70.7 % — SIGNIFICANT CHANGE UP (ref 43–77)
NRBC # BLD: 0 /100 WBCS — SIGNIFICANT CHANGE UP (ref 0–0)
PLATELET # BLD AUTO: 298 K/UL — SIGNIFICANT CHANGE UP (ref 150–400)
POTASSIUM SERPL-MCNC: 4 MMOL/L — SIGNIFICANT CHANGE UP (ref 3.5–5.3)
POTASSIUM SERPL-SCNC: 4 MMOL/L — SIGNIFICANT CHANGE UP (ref 3.5–5.3)
PROT SERPL-MCNC: 7.8 G/DL — SIGNIFICANT CHANGE UP (ref 6–8.3)
RBC # BLD: 4.6 M/UL — SIGNIFICANT CHANGE UP (ref 3.8–5.2)
RBC # FLD: 13.6 % — SIGNIFICANT CHANGE UP (ref 10.3–14.5)
SODIUM SERPL-SCNC: 140 MMOL/L — SIGNIFICANT CHANGE UP (ref 135–145)
TROPONIN I, HIGH SENSITIVITY RESULT: 5.1 NG/L — SIGNIFICANT CHANGE UP
WBC # BLD: 10.42 K/UL — SIGNIFICANT CHANGE UP (ref 3.8–10.5)
WBC # FLD AUTO: 10.42 K/UL — SIGNIFICANT CHANGE UP (ref 3.8–10.5)

## 2024-03-21 PROCEDURE — 85379 FIBRIN DEGRADATION QUANT: CPT

## 2024-03-21 PROCEDURE — 93005 ELECTROCARDIOGRAM TRACING: CPT

## 2024-03-21 PROCEDURE — 84484 ASSAY OF TROPONIN QUANT: CPT

## 2024-03-21 PROCEDURE — 83735 ASSAY OF MAGNESIUM: CPT

## 2024-03-21 PROCEDURE — 71045 X-RAY EXAM CHEST 1 VIEW: CPT

## 2024-03-21 PROCEDURE — 81025 URINE PREGNANCY TEST: CPT

## 2024-03-21 PROCEDURE — 71045 X-RAY EXAM CHEST 1 VIEW: CPT | Mod: 26

## 2024-03-21 PROCEDURE — 80053 COMPREHEN METABOLIC PANEL: CPT

## 2024-03-21 PROCEDURE — 99285 EMERGENCY DEPT VISIT HI MDM: CPT | Mod: 25

## 2024-03-21 PROCEDURE — 85025 COMPLETE CBC W/AUTO DIFF WBC: CPT

## 2024-03-21 PROCEDURE — 36415 COLL VENOUS BLD VENIPUNCTURE: CPT

## 2024-03-21 RX ORDER — SODIUM CHLORIDE 9 MG/ML
1000 INJECTION INTRAMUSCULAR; INTRAVENOUS; SUBCUTANEOUS ONCE
Refills: 0 | Status: COMPLETED | OUTPATIENT
Start: 2024-03-21 | End: 2024-03-21

## 2024-03-21 RX ADMIN — SODIUM CHLORIDE 1000 MILLILITER(S): 9 INJECTION INTRAMUSCULAR; INTRAVENOUS; SUBCUTANEOUS at 00:38

## 2024-05-16 ENCOUNTER — EMERGENCY (EMERGENCY)
Facility: HOSPITAL | Age: 19
LOS: 1 days | Discharge: HOME | End: 2024-05-16
Attending: EMERGENCY MEDICINE | Admitting: EMERGENCY MEDICINE
Payer: MEDICAID

## 2024-05-16 PROCEDURE — 99284 EMERGENCY DEPT VISIT MOD MDM: CPT | Mod: 25

## 2024-05-17 VITALS
OXYGEN SATURATION: 99 % | RESPIRATION RATE: 18 BRPM | DIASTOLIC BLOOD PRESSURE: 80 MMHG | SYSTOLIC BLOOD PRESSURE: 118 MMHG | TEMPERATURE: 98 F | HEART RATE: 78 BPM

## 2024-05-17 VITALS
HEART RATE: 82 BPM | SYSTOLIC BLOOD PRESSURE: 120 MMHG | DIASTOLIC BLOOD PRESSURE: 86 MMHG | TEMPERATURE: 98 F | RESPIRATION RATE: 18 BRPM | HEIGHT: 70 IN | OXYGEN SATURATION: 100 % | WEIGHT: 229.94 LBS

## 2024-05-17 PROBLEM — R55 SYNCOPE AND COLLAPSE: Chronic | Status: ACTIVE | Noted: 2024-03-20

## 2024-05-17 LAB
APPEARANCE UR: CLEAR — SIGNIFICANT CHANGE UP
BACTERIA # UR AUTO: ABNORMAL /HPF
BILIRUB UR-MCNC: NEGATIVE — SIGNIFICANT CHANGE UP
COLOR SPEC: YELLOW — SIGNIFICANT CHANGE UP
DIFF PNL FLD: NEGATIVE — SIGNIFICANT CHANGE UP
EPI CELLS # UR: PRESENT
GLUCOSE UR QL: NEGATIVE MG/DL — SIGNIFICANT CHANGE UP
HCG UR QL: NEGATIVE — SIGNIFICANT CHANGE UP
HIV 1 & 2 AB SERPL IA.RAPID: SIGNIFICANT CHANGE UP
KETONES UR-MCNC: ABNORMAL MG/DL
LEUKOCYTE ESTERASE UR-ACNC: ABNORMAL
NITRITE UR-MCNC: NEGATIVE — SIGNIFICANT CHANGE UP
PH UR: 8 — SIGNIFICANT CHANGE UP (ref 5–8)
PROT UR-MCNC: 30 MG/DL
RBC CASTS # UR COMP ASSIST: 5 /HPF — HIGH (ref 0–4)
SP GR SPEC: 1.03 — SIGNIFICANT CHANGE UP (ref 1–1.03)
UROBILINOGEN FLD QL: 1 MG/DL — SIGNIFICANT CHANGE UP (ref 0.2–1)
WBC UR QL: 50 /HPF — HIGH (ref 0–5)

## 2024-05-17 PROCEDURE — 86703 HIV-1/HIV-2 1 RESULT ANTBDY: CPT

## 2024-05-17 PROCEDURE — 99284 EMERGENCY DEPT VISIT MOD MDM: CPT

## 2024-05-17 PROCEDURE — 87591 N.GONORRHOEAE DNA AMP PROB: CPT

## 2024-05-17 PROCEDURE — 81001 URINALYSIS AUTO W/SCOPE: CPT

## 2024-05-17 PROCEDURE — 36415 COLL VENOUS BLD VENIPUNCTURE: CPT

## 2024-05-17 PROCEDURE — 87491 CHLMYD TRACH DNA AMP PROBE: CPT

## 2024-05-17 PROCEDURE — 81025 URINE PREGNANCY TEST: CPT

## 2024-05-17 PROCEDURE — 87086 URINE CULTURE/COLONY COUNT: CPT

## 2024-05-17 RX ORDER — NITROFURANTOIN MACROCRYSTAL 50 MG
100 CAPSULE ORAL ONCE
Refills: 0 | Status: COMPLETED | OUTPATIENT
Start: 2024-05-17 | End: 2024-05-17

## 2024-05-17 RX ORDER — NITROFURANTOIN MACROCRYSTAL 50 MG
1 CAPSULE ORAL
Qty: 14 | Refills: 0
Start: 2024-05-17 | End: 2024-05-23

## 2024-05-17 RX ADMIN — Medication 100 MILLIGRAM(S): at 01:26

## 2024-05-17 NOTE — ED ADULT NURSE NOTE - OBJECTIVE STATEMENT
Ambulatory to ER w/ c/o white color "clumpy" vaginal discharge and burning on urination since last Sunday. LMP 5/3/24. hx unprotected sex w/ boyfriend. Ambulatory to ER w/ c/o white color "clumpy" vaginal discharge and burning on urination since last Sunday. LMP 5/3/24. hx unprotected sex w/ boyfriend. Urine specimen obtained and sent to lab. Vaginal exam done by Dr. Chacon w/ destiny RN present.

## 2024-05-17 NOTE — ED ADULT TRIAGE NOTE - ARRIVAL FROM
Home [None] : Patient does not have any barriers to medication adherence [Takes medication as prescribed] : takes

## 2024-05-17 NOTE — ED PROVIDER NOTE - CLINICAL SUMMARY MEDICAL DECISION MAKING FREE TEXT BOX
18-year-old female with vaginal discharge and discomfort with urination.  Will evaluate for STI, UTI.  Will get urinalysis.  Reassess.

## 2024-05-17 NOTE — ED PROVIDER NOTE - PATIENT PORTAL LINK FT
You can access the FollowMyHealth Patient Portal offered by [facility] by registering at the following website: [email or web address]. By joining [employer]’s FollowMyHealth portal, you will also be able to view your health information using other applications (apps) compatible with our system.

## 2024-05-17 NOTE — ED PROVIDER NOTE - OBJECTIVE STATEMENT
18-year-old female with no medical problems presents with complaints of discomfort when urinating and some white vaginal discharge for few days.  Patient reports she finished her last menstrual cycle about a week ago and shortly after that felt that discharge.  Denies any prior history of STDs.  Denies any back pain, nausea vomiting or fever and chills.

## 2024-05-18 LAB
CULTURE RESULTS: SIGNIFICANT CHANGE UP
SPECIMEN SOURCE: SIGNIFICANT CHANGE UP

## 2024-05-20 LAB
C TRACH RRNA SPEC QL NAA+PROBE: DETECTED
N GONORRHOEA RRNA SPEC QL NAA+PROBE: SIGNIFICANT CHANGE UP

## 2024-05-21 NOTE — ED POST DISCHARGE NOTE - NS ED POST DC CALL 1
Addended by: NAT HERMAN on: 1/15/2024 02:57 PM     Modules accepted: Orders    
Attempted, left message

## 2024-05-21 NOTE — ED POST DISCHARGE NOTE - DETAILS
left message on pt cell to call back. spoke to pt, informed about results, will rx doxcycline, advised to have pt f/u with GYN, refrain from sexual intercourse and also have partner treated for chlamydia.

## 2024-07-13 ENCOUNTER — NON-APPOINTMENT (OUTPATIENT)
Age: 19
End: 2024-07-13